# Patient Record
Sex: MALE | Race: OTHER | Employment: FULL TIME | URBAN - METROPOLITAN AREA
[De-identification: names, ages, dates, MRNs, and addresses within clinical notes are randomized per-mention and may not be internally consistent; named-entity substitution may affect disease eponyms.]

---

## 2017-09-30 ENCOUNTER — OFFICE VISIT (OUTPATIENT)
Dept: FAMILY MEDICINE CLINIC | Facility: CLINIC | Age: 27
End: 2017-09-30

## 2017-09-30 VITALS
DIASTOLIC BLOOD PRESSURE: 79 MMHG | WEIGHT: 192.19 LBS | SYSTOLIC BLOOD PRESSURE: 128 MMHG | TEMPERATURE: 98 F | HEART RATE: 73 BPM | BODY MASS INDEX: 30.17 KG/M2 | HEIGHT: 67 IN

## 2017-09-30 DIAGNOSIS — R10.11 RUQ PAIN: ICD-10-CM

## 2017-09-30 DIAGNOSIS — K21.9 GASTROESOPHAGEAL REFLUX DISEASE WITHOUT ESOPHAGITIS: Primary | ICD-10-CM

## 2017-09-30 PROCEDURE — 99212 OFFICE O/P EST SF 10 MIN: CPT | Performed by: FAMILY MEDICINE

## 2017-09-30 PROCEDURE — 99204 OFFICE O/P NEW MOD 45 MIN: CPT | Performed by: FAMILY MEDICINE

## 2017-09-30 RX ORDER — OMEPRAZOLE 40 MG/1
40 CAPSULE, DELAYED RELEASE ORAL DAILY
Qty: 90 CAPSULE | Refills: 2 | Status: SHIPPED | OUTPATIENT
Start: 2017-09-30

## 2017-09-30 NOTE — PROGRESS NOTES
9/30/2017  11:57 AM    Berny Yancey is a 34year old male. Chief complaint(s): Patient presents with:  Heartburn: Patient c/o heartburn. States that he notices it with spicy food. Her cannot recall when it started.  Sx include abdominal pain, bloating, na Cardiovascular: Negative for chest pain and palpitations. Gastrointestinal: Positive for heartburn and abdominal pain. Negative for nausea, vomiting, diarrhea and constipation. Musculoskeletal: Negative for back pain and neck pain.    Skin: Negative f Instructions: Avoid any citric juice; lemonade, orange juice, grapefruit juice, pineapple juice, caffeine; coffee, tea, soda, chocolate. Also avoid spicy foods, and alcohol.  In lino avoid over use of NSAID's  FOLLOW-UP: Instructed to call if new or wors

## 2017-10-14 ENCOUNTER — HOSPITAL ENCOUNTER (OUTPATIENT)
Dept: ULTRASOUND IMAGING | Age: 27
Discharge: HOME | End: 2017-10-14
Attending: FAMILY MEDICINE
Payer: COMMERCIAL

## 2017-10-14 ENCOUNTER — LAB ENCOUNTER (OUTPATIENT)
Dept: LAB | Age: 27
End: 2017-10-14
Attending: FAMILY MEDICINE
Payer: COMMERCIAL

## 2017-10-14 DIAGNOSIS — K21.9 GASTROESOPHAGEAL REFLUX DISEASE WITHOUT ESOPHAGITIS: ICD-10-CM

## 2017-10-14 DIAGNOSIS — R10.11 RUQ PAIN: ICD-10-CM

## 2017-10-14 DIAGNOSIS — K21.9 ESOPHAGEAL REFLUX: Primary | ICD-10-CM

## 2017-10-14 PROCEDURE — 76705 ECHO EXAM OF ABDOMEN: CPT | Performed by: FAMILY MEDICINE

## 2017-10-14 PROCEDURE — 83013 H PYLORI (C-13) BREATH: CPT

## 2017-10-28 ENCOUNTER — OFFICE VISIT (OUTPATIENT)
Dept: FAMILY MEDICINE CLINIC | Facility: CLINIC | Age: 27
End: 2017-10-28

## 2017-10-28 VITALS
DIASTOLIC BLOOD PRESSURE: 79 MMHG | BODY MASS INDEX: 30 KG/M2 | SYSTOLIC BLOOD PRESSURE: 127 MMHG | WEIGHT: 193 LBS | HEART RATE: 71 BPM | TEMPERATURE: 98 F

## 2017-10-28 DIAGNOSIS — R16.0 HEPATOMEGALY: ICD-10-CM

## 2017-10-28 DIAGNOSIS — R16.0 LIVER MASS: ICD-10-CM

## 2017-10-28 DIAGNOSIS — K21.9 GASTROESOPHAGEAL REFLUX DISEASE WITHOUT ESOPHAGITIS: Primary | ICD-10-CM

## 2017-10-28 PROCEDURE — 99214 OFFICE O/P EST MOD 30 MIN: CPT | Performed by: FAMILY MEDICINE

## 2017-10-28 PROCEDURE — 99212 OFFICE O/P EST SF 10 MIN: CPT | Performed by: FAMILY MEDICINE

## 2017-10-28 NOTE — PROGRESS NOTES
10/28/2017  12:38 PM    Rashmi Leal is a 24year old male. Chief complaint(s): Patient presents with:   Follow - Up: Patient here to f/u for the abdominal pain/ heartburn and discuss results    HPI:     Rashmi Leal primary complaint is rega Allergies:  No Known Allergies      ROS:   Review of Systems   Constitutional: Negative for chills, fatigue and fever. HENT: Negative for rhinorrhea and sore throat. Respiratory: Negative for cough, shortness of breath and wheezing.     Trisha Selby was evaluated with gray scale and colorflow of the main vessels. FINDINGS:  LIVER:   There is increased echogenicity within the liver consistent with fatty replacement.  Other etiologies can present in this fashion and therefore clinical correlation is re ABDOMEN (CPT=74150)         Ruy Maza MD

## (undated) NOTE — LETTER
October 18, 2017     [address] [place] [id number]      Dear Jude Thomas:    Below are the results from your recent visit: results are within normal limits.      Resulted Orders   HELICOBACTER PYLORI BREATH TEST, ADULT (>17)   Result